# Patient Record
(demographics unavailable — no encounter records)

---

## 2017-10-23 NOTE — CT
CT OF THE ABDOMEN AND PELVIS WITH CONTRAST:

 

History: Left sided flank pain. History of appendectomy. 

 

Technique: Contrast enhanced images of the abdomen and pelvis obtained after the administration of I
V and oral contrast. 

 

FINDINGS: 

The lung bases are unremarkable. 

 

No evidence of free intraperitoneal air is seen. 

 

The liver, spleen, pancreas, and gallbladder are unremarkable. Some small cysts appear to be present
 in the right and left hepatic lobes. 

 

Adrenal glands are unremarkable. The right kidney is unremarkable. Cortical cysts seen in the midpol
e of the left kidney. 

 

Atherosclerotic calcification of the abdominal aorta is seen. No evidence of periaortic lymphadenopa
thy is seen. 

 

No dilated loop of small bowel seen. No significant colonic obstruction is seen. 

 

Multilevel lumbar degenerative changes seen. 

 

IMPRESSION: 

1. Multilevel degenerative changes. No significant evidence of intraabdominal abscess or mass seen. 

 

POS: [facility]

## 2017-10-23 NOTE — RAD
AP CHEST:

 

History: Left flank pain. 

 

Date: 10-23-17

 

FINDINGS: 

Cardiomegaly is noted. The lungs are well aerated. No evidence of active intrathoracic disease is se
en. No evidence of effusions, pneumonia or pneumothorax is seen. 

 

IMPRESSION: 

Cardiomegaly. Otherwise, unremarkable AP chest. 

 

POS: [facility]